# Patient Record
Sex: MALE | Race: WHITE | Employment: FULL TIME | URBAN - METROPOLITAN AREA
[De-identification: names, ages, dates, MRNs, and addresses within clinical notes are randomized per-mention and may not be internally consistent; named-entity substitution may affect disease eponyms.]

---

## 2017-03-09 ENCOUNTER — OFFICE VISIT (OUTPATIENT)
Dept: INTERNAL MEDICINE CLINIC | Age: 52
End: 2017-03-09

## 2017-03-09 VITALS
HEART RATE: 72 BPM | SYSTOLIC BLOOD PRESSURE: 120 MMHG | TEMPERATURE: 98.8 F | BODY MASS INDEX: 26.74 KG/M2 | HEIGHT: 71 IN | WEIGHT: 191 LBS | DIASTOLIC BLOOD PRESSURE: 80 MMHG

## 2017-03-09 DIAGNOSIS — J06.9 VIRAL URI WITH COUGH: Primary | ICD-10-CM

## 2017-03-09 PROCEDURE — 99213 OFFICE O/P EST LOW 20 MIN: CPT | Performed by: FAMILY MEDICINE

## 2017-03-09 RX ORDER — BENZONATATE 100 MG/1
100-200 CAPSULE ORAL 3 TIMES DAILY PRN
Qty: 25 CAPSULE | Refills: 0 | Status: SHIPPED | OUTPATIENT
Start: 2017-03-09 | End: 2017-06-30 | Stop reason: ALTCHOICE

## 2017-03-12 ASSESSMENT — ENCOUNTER SYMPTOMS
VOMITING: 0
DIARRHEA: 0
CHEST TIGHTNESS: 0
RHINORRHEA: 1
SORE THROAT: 0
COUGH: 1
NAUSEA: 0
WHEEZING: 0
SINUS PRESSURE: 1

## 2017-06-27 ENCOUNTER — HOSPITAL ENCOUNTER (OUTPATIENT)
Dept: GENERAL RADIOLOGY | Age: 52
Discharge: HOME | End: 2017-06-27
Attending: INTERNAL MEDICINE | Admitting: INTERNAL MEDICINE

## 2017-06-27 DIAGNOSIS — Z00.00 WELLNESS EXAMINATION: ICD-10-CM

## 2017-06-27 LAB
A/G RATIO: 2 (ref 1.1–2.2)
ALBUMIN SERPL-MCNC: 4.5 G/DL (ref 3.4–5)
ALP BLD-CCNC: 63 U/L (ref 40–129)
ALT SERPL-CCNC: 31 U/L (ref 10–40)
ANION GAP SERPL CALCULATED.3IONS-SCNC: 16 MMOL/L (ref 3–16)
AST SERPL-CCNC: 20 U/L (ref 15–37)
BILIRUB SERPL-MCNC: 0.6 MG/DL (ref 0–1)
BUN BLDV-MCNC: 17 MG/DL (ref 7–20)
CALCIUM SERPL-MCNC: 9.5 MG/DL (ref 8.3–10.6)
CHLORIDE BLD-SCNC: 100 MMOL/L (ref 99–110)
CHOLESTEROL, TOTAL: 241 MG/DL (ref 0–199)
CO2: 23 MMOL/L (ref 21–32)
CREAT SERPL-MCNC: 1.1 MG/DL (ref 0.9–1.3)
GFR AFRICAN AMERICAN: >60
GFR NON-AFRICAN AMERICAN: >60
GLOBULIN: 2.3 G/DL
GLUCOSE BLD-MCNC: 93 MG/DL (ref 70–99)
HCT VFR BLD CALC: 46 % (ref 40.5–52.5)
HDLC SERPL-MCNC: 60 MG/DL (ref 40–60)
HEMOGLOBIN: 16 G/DL (ref 13.5–17.5)
LDL CHOLESTEROL CALCULATED: 152 MG/DL
MCH RBC QN AUTO: 30.3 PG (ref 26–34)
MCHC RBC AUTO-ENTMCNC: 34.7 G/DL (ref 31–36)
MCV RBC AUTO: 87.2 FL (ref 80–100)
PDW BLD-RTO: 13.4 % (ref 12.4–15.4)
PLATELET # BLD: 153 K/UL (ref 135–450)
PMV BLD AUTO: 8.1 FL (ref 5–10.5)
POTASSIUM SERPL-SCNC: 4 MMOL/L (ref 3.5–5.1)
RBC # BLD: 5.27 M/UL (ref 4.2–5.9)
SODIUM BLD-SCNC: 139 MMOL/L (ref 136–145)
TOTAL PROTEIN: 6.8 G/DL (ref 6.4–8.2)
TRIGL SERPL-MCNC: 144 MG/DL (ref 0–150)
VITAMIN D 25-HYDROXY: 36.2 NG/ML
VLDLC SERPL CALC-MCNC: 29 MG/DL
WBC # BLD: 6.1 K/UL (ref 4–11)

## 2017-06-30 ENCOUNTER — OFFICE VISIT (OUTPATIENT)
Dept: INTERNAL MEDICINE CLINIC | Age: 52
End: 2017-06-30

## 2017-06-30 VITALS
OXYGEN SATURATION: 97 % | WEIGHT: 192 LBS | DIASTOLIC BLOOD PRESSURE: 72 MMHG | BODY MASS INDEX: 27.49 KG/M2 | SYSTOLIC BLOOD PRESSURE: 114 MMHG | HEIGHT: 70 IN | HEART RATE: 62 BPM

## 2017-06-30 DIAGNOSIS — K63.5 COLON POLYPS: ICD-10-CM

## 2017-06-30 DIAGNOSIS — G47.00 INSOMNIA, UNSPECIFIED TYPE: ICD-10-CM

## 2017-06-30 DIAGNOSIS — Z00.00 ANNUAL PHYSICAL EXAM: Primary | ICD-10-CM

## 2017-06-30 DIAGNOSIS — E78.5 ELEVATED LIPIDS: ICD-10-CM

## 2017-06-30 PROCEDURE — 99396 PREV VISIT EST AGE 40-64: CPT | Performed by: INTERNAL MEDICINE

## 2018-06-04 ENCOUNTER — TELEPHONE (OUTPATIENT)
Dept: INTERNAL MEDICINE CLINIC | Age: 53
End: 2018-06-04

## 2018-06-04 DIAGNOSIS — Z00.00 ANNUAL PHYSICAL EXAM: Primary | ICD-10-CM

## 2018-06-11 ENCOUNTER — HOSPITAL ENCOUNTER (OUTPATIENT)
Dept: GENERAL RADIOLOGY | Age: 53
Discharge: HOME | End: 2018-06-11
Attending: INTERNAL MEDICINE | Admitting: INTERNAL MEDICINE

## 2018-06-11 DIAGNOSIS — Z00.00 ANNUAL PHYSICAL EXAM: ICD-10-CM

## 2018-06-11 LAB
A/G RATIO: 1.9 (ref 1.1–2.2)
ALBUMIN SERPL-MCNC: 4.3 G/DL (ref 3.4–5)
ALP BLD-CCNC: 59 U/L (ref 40–129)
ALT SERPL-CCNC: 28 U/L (ref 10–40)
ANION GAP SERPL CALCULATED.3IONS-SCNC: 16 MMOL/L (ref 3–16)
AST SERPL-CCNC: 19 U/L (ref 15–37)
BILIRUB SERPL-MCNC: 0.6 MG/DL (ref 0–1)
BUN BLDV-MCNC: 17 MG/DL (ref 7–20)
CALCIUM SERPL-MCNC: 9.3 MG/DL (ref 8.3–10.6)
CHLORIDE BLD-SCNC: 103 MMOL/L (ref 99–110)
CHOLESTEROL, TOTAL: 204 MG/DL (ref 0–199)
CO2: 21 MMOL/L (ref 21–32)
CREAT SERPL-MCNC: 1 MG/DL (ref 0.9–1.3)
GFR AFRICAN AMERICAN: >60
GFR NON-AFRICAN AMERICAN: >60
GLOBULIN: 2.3 G/DL
GLUCOSE BLD-MCNC: 94 MG/DL (ref 70–99)
HCT VFR BLD CALC: 45.8 % (ref 40.5–52.5)
HDLC SERPL-MCNC: 62 MG/DL (ref 40–60)
HEMOGLOBIN: 16.2 G/DL (ref 13.5–17.5)
LDL CHOLESTEROL CALCULATED: 129 MG/DL
MCH RBC QN AUTO: 31 PG (ref 26–34)
MCHC RBC AUTO-ENTMCNC: 35.4 G/DL (ref 31–36)
MCV RBC AUTO: 87.4 FL (ref 80–100)
PDW BLD-RTO: 13.7 % (ref 12.4–15.4)
PLATELET # BLD: 161 K/UL (ref 135–450)
PMV BLD AUTO: 8.4 FL (ref 5–10.5)
POTASSIUM SERPL-SCNC: 4 MMOL/L (ref 3.5–5.1)
PROSTATE SPECIFIC ANTIGEN: 0.56 NG/ML (ref 0–4)
RBC # BLD: 5.24 M/UL (ref 4.2–5.9)
SODIUM BLD-SCNC: 140 MMOL/L (ref 136–145)
TOTAL PROTEIN: 6.6 G/DL (ref 6.4–8.2)
TRIGL SERPL-MCNC: 67 MG/DL (ref 0–150)
TSH REFLEX: 1.13 UIU/ML (ref 0.27–4.2)
VITAMIN D 25-HYDROXY: 25.7 NG/ML
VLDLC SERPL CALC-MCNC: 13 MG/DL
WBC # BLD: 5.5 K/UL (ref 4–11)

## 2018-06-18 ENCOUNTER — OFFICE VISIT (OUTPATIENT)
Dept: INTERNAL MEDICINE CLINIC | Age: 53
End: 2018-06-18

## 2018-06-18 VITALS
BODY MASS INDEX: 28.49 KG/M2 | OXYGEN SATURATION: 98 % | HEIGHT: 70 IN | WEIGHT: 199 LBS | DIASTOLIC BLOOD PRESSURE: 78 MMHG | SYSTOLIC BLOOD PRESSURE: 120 MMHG | HEART RATE: 66 BPM

## 2018-06-18 DIAGNOSIS — Z00.00 ANNUAL PHYSICAL EXAM: Primary | ICD-10-CM

## 2018-06-18 DIAGNOSIS — R31.9 HEMATURIA, UNSPECIFIED TYPE: ICD-10-CM

## 2018-06-18 DIAGNOSIS — G47.00 INSOMNIA, UNSPECIFIED TYPE: ICD-10-CM

## 2018-06-18 DIAGNOSIS — E78.5 ELEVATED LIPIDS: ICD-10-CM

## 2018-06-18 LAB
BILIRUBIN, POC: ABNORMAL
BLOOD URINE, POC: ABNORMAL
CLARITY, POC: CLEAR
COLOR, POC: YELLOW
GLUCOSE URINE, POC: ABNORMAL
KETONES, POC: ABNORMAL
LEUKOCYTE EST, POC: ABNORMAL
NITRITE, POC: ABNORMAL
PH, POC: 5
PROTEIN, POC: ABNORMAL
SPECIFIC GRAVITY, POC: 1.02
UROBILINOGEN, POC: 0.2

## 2018-06-18 PROCEDURE — 81002 URINALYSIS NONAUTO W/O SCOPE: CPT | Performed by: INTERNAL MEDICINE

## 2018-06-18 PROCEDURE — 99396 PREV VISIT EST AGE 40-64: CPT | Performed by: INTERNAL MEDICINE

## 2018-06-18 ASSESSMENT — PATIENT HEALTH QUESTIONNAIRE - PHQ9
SUM OF ALL RESPONSES TO PHQ QUESTIONS 1-9: 0
SUM OF ALL RESPONSES TO PHQ9 QUESTIONS 1 & 2: 0
2. FEELING DOWN, DEPRESSED OR HOPELESS: 0
1. LITTLE INTEREST OR PLEASURE IN DOING THINGS: 0

## 2018-09-18 ENCOUNTER — OFFICE VISIT (OUTPATIENT)
Dept: INTERNAL MEDICINE CLINIC | Age: 53
End: 2018-09-18

## 2018-09-18 ENCOUNTER — HOSPITAL ENCOUNTER (OUTPATIENT)
Age: 53
Discharge: HOME | End: 2018-09-18
Payer: COMMERCIAL

## 2018-09-18 VITALS
HEIGHT: 70 IN | HEART RATE: 61 BPM | BODY MASS INDEX: 29.06 KG/M2 | SYSTOLIC BLOOD PRESSURE: 124 MMHG | WEIGHT: 203 LBS | OXYGEN SATURATION: 98 % | DIASTOLIC BLOOD PRESSURE: 76 MMHG | TEMPERATURE: 98.5 F

## 2018-09-18 DIAGNOSIS — R50.9 FEVER, UNSPECIFIED FEVER CAUSE: ICD-10-CM

## 2018-09-18 DIAGNOSIS — R51.9 NONINTRACTABLE EPISODIC HEADACHE, UNSPECIFIED HEADACHE TYPE: Primary | ICD-10-CM

## 2018-09-18 LAB
BASOPHILS ABSOLUTE: 0 K/UL (ref 0–0.2)
BASOPHILS RELATIVE PERCENT: 0.7 %
C-REACTIVE PROTEIN: <0.3 MG/L (ref 0–5.1)
EOSINOPHILS ABSOLUTE: 0.2 K/UL (ref 0–0.6)
EOSINOPHILS RELATIVE PERCENT: 4 %
HCT VFR BLD CALC: 46.2 % (ref 40.5–52.5)
HEMOGLOBIN: 15.9 G/DL (ref 13.5–17.5)
LYMPHOCYTES ABSOLUTE: 2.2 K/UL (ref 1–5.1)
LYMPHOCYTES RELATIVE PERCENT: 35.7 %
MCH RBC QN AUTO: 30.2 PG (ref 26–34)
MCHC RBC AUTO-ENTMCNC: 34.3 G/DL (ref 31–36)
MCV RBC AUTO: 88 FL (ref 80–100)
MONOCYTES ABSOLUTE: 0.6 K/UL (ref 0–1.3)
MONOCYTES RELATIVE PERCENT: 9.9 %
NEUTROPHILS ABSOLUTE: 3 K/UL (ref 1.7–7.7)
NEUTROPHILS RELATIVE PERCENT: 49.7 %
PDW BLD-RTO: 13.4 % (ref 12.4–15.4)
PLATELET # BLD: 166 K/UL (ref 135–450)
PMV BLD AUTO: 8.1 FL (ref 5–10.5)
RBC # BLD: 5.26 M/UL (ref 4.2–5.9)
SEDIMENTATION RATE, ERYTHROCYTE: 7 MM/HR (ref 0–20)
WBC # BLD: 6.1 K/UL (ref 4–11)

## 2018-09-18 PROCEDURE — 99213 OFFICE O/P EST LOW 20 MIN: CPT | Performed by: NURSE PRACTITIONER

## 2018-09-18 PROCEDURE — 86038 ANTINUCLEAR ANTIBODIES: CPT

## 2018-09-18 PROCEDURE — 85025 COMPLETE CBC W/AUTO DIFF WBC: CPT

## 2018-09-18 PROCEDURE — 36415 COLL VENOUS BLD VENIPUNCTURE: CPT

## 2018-09-18 PROCEDURE — 85652 RBC SED RATE AUTOMATED: CPT

## 2018-09-18 PROCEDURE — 86140 C-REACTIVE PROTEIN: CPT

## 2018-09-18 ASSESSMENT — ENCOUNTER SYMPTOMS
SORE THROAT: 0
SHORTNESS OF BREATH: 0
BLOOD IN STOOL: 0
EYE DISCHARGE: 0
RHINORRHEA: 0
DIARRHEA: 0
COLOR CHANGE: 0
EYES NEGATIVE: 1
CONSTIPATION: 0
COUGH: 0
BACK PAIN: 0
EYE PAIN: 0

## 2018-09-19 LAB
ANA INTERPRETATION: NORMAL
ANTI-NUCLEAR ANTIBODY (ANA): NEGATIVE

## 2019-07-17 ENCOUNTER — TELEPHONE (OUTPATIENT)
Dept: INTERNAL MEDICINE CLINIC | Age: 54
End: 2019-07-17

## 2019-07-17 DIAGNOSIS — Z13.1 SCREENING FOR DIABETES MELLITUS (DM): Primary | ICD-10-CM

## 2019-07-17 DIAGNOSIS — Z13.0 SCREENING FOR DEFICIENCY ANEMIA: ICD-10-CM

## 2019-07-17 DIAGNOSIS — Z12.5 SCREENING FOR PROSTATE CANCER: ICD-10-CM

## 2019-07-17 DIAGNOSIS — Z13.220 SCREENING FOR LIPID DISORDERS: ICD-10-CM

## 2019-08-09 ENCOUNTER — HOSPITAL ENCOUNTER (OUTPATIENT)
Age: 54
Discharge: HOME | End: 2019-08-09
Payer: COMMERCIAL

## 2019-08-09 DIAGNOSIS — Z13.1 SCREENING FOR DIABETES MELLITUS (DM): ICD-10-CM

## 2019-08-09 DIAGNOSIS — Z13.0 SCREENING FOR DEFICIENCY ANEMIA: ICD-10-CM

## 2019-08-09 DIAGNOSIS — Z12.5 SCREENING FOR PROSTATE CANCER: ICD-10-CM

## 2019-08-09 DIAGNOSIS — Z13.220 SCREENING FOR LIPID DISORDERS: ICD-10-CM

## 2019-08-09 LAB
A/G RATIO: 1.8 (ref 1.1–2.2)
ALBUMIN SERPL-MCNC: 4.4 G/DL (ref 3.4–5)
ALP BLD-CCNC: 60 U/L (ref 40–129)
ALT SERPL-CCNC: 76 U/L (ref 10–40)
ANION GAP SERPL CALCULATED.3IONS-SCNC: 12 MMOL/L (ref 3–16)
AST SERPL-CCNC: 35 U/L (ref 15–37)
BASOPHILS ABSOLUTE: 0.1 K/UL (ref 0–0.2)
BASOPHILS RELATIVE PERCENT: 0.9 %
BILIRUB SERPL-MCNC: 0.5 MG/DL (ref 0–1)
BUN BLDV-MCNC: 13 MG/DL (ref 7–20)
CALCIUM SERPL-MCNC: 9.6 MG/DL (ref 8.3–10.6)
CHLORIDE BLD-SCNC: 101 MMOL/L (ref 99–110)
CHOLESTEROL, TOTAL: 205 MG/DL (ref 0–199)
CO2: 25 MMOL/L (ref 21–32)
CREAT SERPL-MCNC: 1.2 MG/DL (ref 0.9–1.3)
EOSINOPHILS ABSOLUTE: 0.2 K/UL (ref 0–0.6)
EOSINOPHILS RELATIVE PERCENT: 4 %
GFR AFRICAN AMERICAN: >60
GFR NON-AFRICAN AMERICAN: >60
GLOBULIN: 2.4 G/DL
GLUCOSE BLD-MCNC: 93 MG/DL (ref 70–99)
HCT VFR BLD CALC: 46.6 % (ref 40.5–52.5)
HDLC SERPL-MCNC: 61 MG/DL (ref 40–60)
HEMOGLOBIN: 16 G/DL (ref 13.5–17.5)
LDL CHOLESTEROL CALCULATED: 123 MG/DL
LYMPHOCYTES ABSOLUTE: 2.1 K/UL (ref 1–5.1)
LYMPHOCYTES RELATIVE PERCENT: 34 %
MCH RBC QN AUTO: 30.4 PG (ref 26–34)
MCHC RBC AUTO-ENTMCNC: 34.4 G/DL (ref 31–36)
MCV RBC AUTO: 88.4 FL (ref 80–100)
MONOCYTES ABSOLUTE: 0.6 K/UL (ref 0–1.3)
MONOCYTES RELATIVE PERCENT: 9.4 %
NEUTROPHILS ABSOLUTE: 3.2 K/UL (ref 1.7–7.7)
NEUTROPHILS RELATIVE PERCENT: 51.7 %
PDW BLD-RTO: 13.9 % (ref 12.4–15.4)
PLATELET # BLD: 149 K/UL (ref 135–450)
PMV BLD AUTO: 8.2 FL (ref 5–10.5)
POTASSIUM SERPL-SCNC: 4.3 MMOL/L (ref 3.5–5.1)
PROSTATE SPECIFIC ANTIGEN: 0.76 NG/ML (ref 0–4)
RBC # BLD: 5.27 M/UL (ref 4.2–5.9)
SODIUM BLD-SCNC: 138 MMOL/L (ref 136–145)
TOTAL PROTEIN: 6.8 G/DL (ref 6.4–8.2)
TRIGL SERPL-MCNC: 103 MG/DL (ref 0–150)
VLDLC SERPL CALC-MCNC: 21 MG/DL
WBC # BLD: 6.2 K/UL (ref 4–11)

## 2019-08-09 PROCEDURE — 85025 COMPLETE CBC W/AUTO DIFF WBC: CPT

## 2019-08-09 PROCEDURE — 36415 COLL VENOUS BLD VENIPUNCTURE: CPT

## 2019-08-09 PROCEDURE — 80061 LIPID PANEL: CPT

## 2019-08-09 PROCEDURE — 80053 COMPREHEN METABOLIC PANEL: CPT

## 2019-08-09 PROCEDURE — 84153 ASSAY OF PSA TOTAL: CPT

## 2019-08-19 ENCOUNTER — OFFICE VISIT (OUTPATIENT)
Dept: INTERNAL MEDICINE CLINIC | Age: 54
End: 2019-08-19
Payer: COMMERCIAL

## 2019-08-19 VITALS
HEART RATE: 78 BPM | BODY MASS INDEX: 28.7 KG/M2 | DIASTOLIC BLOOD PRESSURE: 80 MMHG | OXYGEN SATURATION: 98 % | HEIGHT: 71 IN | WEIGHT: 205 LBS | SYSTOLIC BLOOD PRESSURE: 118 MMHG

## 2019-08-19 DIAGNOSIS — E66.3 OVERWEIGHT (BMI 25.0-29.9): ICD-10-CM

## 2019-08-19 DIAGNOSIS — E78.41 ELEVATED LIPOPROTEIN(A): ICD-10-CM

## 2019-08-19 DIAGNOSIS — Z13.1 SCREENING FOR DIABETES MELLITUS: ICD-10-CM

## 2019-08-19 DIAGNOSIS — Z12.5 SCREENING PSA (PROSTATE SPECIFIC ANTIGEN): ICD-10-CM

## 2019-08-19 DIAGNOSIS — Z00.00 ENCOUNTER FOR WELL ADULT EXAM WITHOUT ABNORMAL FINDINGS: Primary | ICD-10-CM

## 2019-08-19 DIAGNOSIS — Z13.0 SCREENING FOR DEFICIENCY ANEMIA: ICD-10-CM

## 2019-08-19 PROCEDURE — 99396 PREV VISIT EST AGE 40-64: CPT | Performed by: NURSE PRACTITIONER

## 2019-08-19 ASSESSMENT — ENCOUNTER SYMPTOMS
SORE THROAT: 0
COUGH: 0
CHEST TIGHTNESS: 0
NAUSEA: 0
SHORTNESS OF BREATH: 0
VOMITING: 0
DIARRHEA: 0
CONSTIPATION: 0
SINUS PAIN: 0

## 2019-08-19 ASSESSMENT — PATIENT HEALTH QUESTIONNAIRE - PHQ9
2. FEELING DOWN, DEPRESSED OR HOPELESS: 0
1. LITTLE INTEREST OR PLEASURE IN DOING THINGS: 0
SUM OF ALL RESPONSES TO PHQ QUESTIONS 1-9: 0
SUM OF ALL RESPONSES TO PHQ9 QUESTIONS 1 & 2: 0
SUM OF ALL RESPONSES TO PHQ QUESTIONS 1-9: 0

## 2020-08-03 ENCOUNTER — HOSPITAL ENCOUNTER (OUTPATIENT)
Age: 55
Discharge: HOME | End: 2020-08-03
Payer: COMMERCIAL

## 2020-08-03 LAB
A/G RATIO: 1.7 (ref 1.1–2.2)
ALBUMIN SERPL-MCNC: 4.2 G/DL (ref 3.4–5)
ALP BLD-CCNC: 57 U/L (ref 40–129)
ALT SERPL-CCNC: 48 U/L (ref 10–40)
ANION GAP SERPL CALCULATED.3IONS-SCNC: 13 MMOL/L (ref 3–16)
AST SERPL-CCNC: 29 U/L (ref 15–37)
BASOPHILS ABSOLUTE: 0 K/UL (ref 0–0.2)
BASOPHILS RELATIVE PERCENT: 0.9 %
BILIRUB SERPL-MCNC: 0.6 MG/DL (ref 0–1)
BUN BLDV-MCNC: 12 MG/DL (ref 7–20)
CALCIUM SERPL-MCNC: 9.3 MG/DL (ref 8.3–10.6)
CHLORIDE BLD-SCNC: 104 MMOL/L (ref 99–110)
CHOLESTEROL, TOTAL: 214 MG/DL (ref 0–199)
CO2: 23 MMOL/L (ref 21–32)
CREAT SERPL-MCNC: 1.1 MG/DL (ref 0.9–1.3)
EOSINOPHILS ABSOLUTE: 0.3 K/UL (ref 0–0.6)
EOSINOPHILS RELATIVE PERCENT: 5 %
GFR AFRICAN AMERICAN: >60
GFR NON-AFRICAN AMERICAN: >60
GLOBULIN: 2.5 G/DL
GLUCOSE BLD-MCNC: 100 MG/DL (ref 70–99)
HCT VFR BLD CALC: 47 % (ref 40.5–52.5)
HDLC SERPL-MCNC: 62 MG/DL (ref 40–60)
HEMOGLOBIN: 16.1 G/DL (ref 13.5–17.5)
LDL CHOLESTEROL CALCULATED: 135 MG/DL
LYMPHOCYTES ABSOLUTE: 2.1 K/UL (ref 1–5.1)
LYMPHOCYTES RELATIVE PERCENT: 38.4 %
MCH RBC QN AUTO: 30.3 PG (ref 26–34)
MCHC RBC AUTO-ENTMCNC: 34.2 G/DL (ref 31–36)
MCV RBC AUTO: 88.7 FL (ref 80–100)
MONOCYTES ABSOLUTE: 0.6 K/UL (ref 0–1.3)
MONOCYTES RELATIVE PERCENT: 10.8 %
NEUTROPHILS ABSOLUTE: 2.4 K/UL (ref 1.7–7.7)
NEUTROPHILS RELATIVE PERCENT: 44.9 %
PDW BLD-RTO: 14 % (ref 12.4–15.4)
PLATELET # BLD: 140 K/UL (ref 135–450)
PMV BLD AUTO: 8.3 FL (ref 5–10.5)
POTASSIUM SERPL-SCNC: 4.3 MMOL/L (ref 3.5–5.1)
PROSTATE SPECIFIC ANTIGEN: 0.88 NG/ML (ref 0–4)
RBC # BLD: 5.31 M/UL (ref 4.2–5.9)
SODIUM BLD-SCNC: 140 MMOL/L (ref 136–145)
TOTAL PROTEIN: 6.7 G/DL (ref 6.4–8.2)
TRIGL SERPL-MCNC: 86 MG/DL (ref 0–150)
VLDLC SERPL CALC-MCNC: 17 MG/DL
WBC # BLD: 5.4 K/UL (ref 4–11)

## 2020-08-03 PROCEDURE — 80053 COMPREHEN METABOLIC PANEL: CPT

## 2020-08-03 PROCEDURE — 84153 ASSAY OF PSA TOTAL: CPT

## 2020-08-03 PROCEDURE — 80061 LIPID PANEL: CPT

## 2020-08-03 PROCEDURE — 85025 COMPLETE CBC W/AUTO DIFF WBC: CPT

## 2020-08-03 PROCEDURE — 36415 COLL VENOUS BLD VENIPUNCTURE: CPT

## 2020-09-23 ENCOUNTER — OFFICE VISIT (OUTPATIENT)
Dept: INTERNAL MEDICINE CLINIC | Age: 55
End: 2020-09-23
Payer: COMMERCIAL

## 2020-09-23 VITALS
SYSTOLIC BLOOD PRESSURE: 120 MMHG | HEART RATE: 76 BPM | OXYGEN SATURATION: 99 % | HEIGHT: 71 IN | WEIGHT: 206 LBS | DIASTOLIC BLOOD PRESSURE: 82 MMHG | BODY MASS INDEX: 28.84 KG/M2 | TEMPERATURE: 98 F

## 2020-09-23 PROCEDURE — 99396 PREV VISIT EST AGE 40-64: CPT | Performed by: NURSE PRACTITIONER

## 2020-09-23 ASSESSMENT — ENCOUNTER SYMPTOMS
CHEST TIGHTNESS: 0
NAUSEA: 0
SHORTNESS OF BREATH: 0
VOMITING: 0
COUGH: 0
CONSTIPATION: 0
SINUS PAIN: 0
DIARRHEA: 0
SORE THROAT: 0

## 2020-09-23 ASSESSMENT — PATIENT HEALTH QUESTIONNAIRE - PHQ9
2. FEELING DOWN, DEPRESSED OR HOPELESS: 0
SUM OF ALL RESPONSES TO PHQ QUESTIONS 1-9: 0
SUM OF ALL RESPONSES TO PHQ9 QUESTIONS 1 & 2: 0
1. LITTLE INTEREST OR PLEASURE IN DOING THINGS: 0
SUM OF ALL RESPONSES TO PHQ QUESTIONS 1-9: 0

## 2020-09-23 NOTE — PROGRESS NOTES
you at risk of harm, such as climbing, using stairs,operating machinery, or driving vehicles. If you feel you cannot safely do these activities, you should request others to help you, or avoid the activities altogether. If you are drowsy for any other reason, you should usethe same precautions as listed above. Call if pattern of symptoms change or persists for an extended time.     Grace Olivia, FNP-C

## 2021-03-16 ENCOUNTER — OFFICE VISIT (OUTPATIENT)
Dept: INTERNAL MEDICINE CLINIC | Age: 56
End: 2021-03-16
Payer: COMMERCIAL

## 2021-03-16 VITALS
OXYGEN SATURATION: 98 % | SYSTOLIC BLOOD PRESSURE: 110 MMHG | WEIGHT: 210 LBS | DIASTOLIC BLOOD PRESSURE: 70 MMHG | BODY MASS INDEX: 29.4 KG/M2 | HEART RATE: 67 BPM | TEMPERATURE: 97.3 F | HEIGHT: 71 IN

## 2021-03-16 DIAGNOSIS — H00.11 CHALAZION OF RIGHT UPPER EYELID: Primary | ICD-10-CM

## 2021-03-16 PROCEDURE — 99213 OFFICE O/P EST LOW 20 MIN: CPT | Performed by: NURSE PRACTITIONER

## 2021-03-16 RX ORDER — CEPHALEXIN 500 MG/1
500 CAPSULE ORAL 3 TIMES DAILY
Qty: 21 CAPSULE | Refills: 0 | Status: SHIPPED | OUTPATIENT
Start: 2021-03-16 | End: 2021-03-23

## 2021-03-16 SDOH — ECONOMIC STABILITY: TRANSPORTATION INSECURITY
IN THE PAST 12 MONTHS, HAS THE LACK OF TRANSPORTATION KEPT YOU FROM MEDICAL APPOINTMENTS OR FROM GETTING MEDICATIONS?: NOT ASKED

## 2021-03-16 SDOH — ECONOMIC STABILITY: FOOD INSECURITY: WITHIN THE PAST 12 MONTHS, YOU WORRIED THAT YOUR FOOD WOULD RUN OUT BEFORE YOU GOT MONEY TO BUY MORE.: NEVER TRUE

## 2021-03-16 ASSESSMENT — ENCOUNTER SYMPTOMS
SHORTNESS OF BREATH: 0
DIARRHEA: 0
CONSTIPATION: 0
PHOTOPHOBIA: 0
VOMITING: 0
NAUSEA: 0
COUGH: 0
SORE THROAT: 0
SINUS PAIN: 0
EYE PAIN: 1
CHEST TIGHTNESS: 0

## 2021-03-16 ASSESSMENT — PATIENT HEALTH QUESTIONNAIRE - PHQ9
1. LITTLE INTEREST OR PLEASURE IN DOING THINGS: 0
SUM OF ALL RESPONSES TO PHQ QUESTIONS 1-9: 0
2. FEELING DOWN, DEPRESSED OR HOPELESS: 0

## 2021-03-16 NOTE — PROGRESS NOTES
[address] [facility]  [address]  Tel:[phone]    Brook Centeno is a 60 y.o. male who presents today for his medical conditions/complaints as noted below. Brook Centeno is c/o of Eye Problem (Red and swollen upper right eye lid x1 week.  )      Chief Complaint   Patient presents with    Eye Problem     Red and swollen upper right eye lid x1 week. HPI:     Mr. Martha Montano presents with mildly painful swelling of the right upper eyelid for roughly 1 week. He states he does not know a particular cause, however was rubbing his eye and noticed tenderness. He took out his contact which seemed to help symptoms. He seems to have noticed worsening swelling/pressure lately. States he has switched to wearing glasses for the time being. Denies decreased vision other than wearing old glasses. Denies drainage, fever, headache, cheek/dental pain. Has used warm compresses to treat with minimal relief. Past Medical History:   Diagnosis Date    Colon polyps 2016    sessile serrated adenomas: Dr. Squires Sasusan Hyperlipidemia     Sinusitis     Tinnitus of both ears         Past Surgical History:   Procedure Laterality Date    COLONOSCOPY  2016    sessile serrated adenomas x 4: Diverticulosis. Dr. [address]       Family History   Problem Relation Age of Onset    Cancer Father         Throat CA    Other Father         Tob / ETOH use    Cancer Mother         Breast CA with Mets    Arthritis Mother        Social History     Tobacco Use    Smoking status: Former Smoker     Packs/day: 1.00     Years: 12.00     Pack years: 12.00     Types: Cigarettes     Quit date: 1995     Years since quittin.1    Smokeless tobacco: Never Used   Substance Use Topics    Alcohol use:  Yes     Alcohol/week: 5.8 standard drinks     Types: 7 Standard drinks or equivalent per week     Comment: 1 a day        Current Outpatient Medications Medication Sig Dispense Refill    Cholecalciferol (VITAMIN D3 PO) Take by mouth      cephALEXin (KEFLEX) 500 MG capsule Take 1 capsule by mouth 3 times daily for 7 days 21 capsule 0     No current facility-administered medications for this visit. No Known Allergies    Subjective:      Review of Systems   Constitutional: Negative for fever. HENT: Negative for sinus pain and sore throat. Eyes: Positive for pain (Swelling upper right eyelid). Negative for photophobia and visual disturbance. Respiratory: Negative for cough, chest tightness and shortness of breath. Cardiovascular: Negative for chest pain and palpitations. Gastrointestinal: Negative for constipation, diarrhea, nausea and vomiting. Genitourinary: Negative for dysuria and urgency. Skin: Negative for rash. Neurological: Negative for dizziness and weakness. Objective:     Vitals:    03/16/21 1056   BP: 110/70   Site: Left Upper Arm   Position: Sitting   Cuff Size: Medium Adult   Pulse: 67   Temp: 97.3 °F (36.3 °C)   TempSrc: Infrared   SpO2: 98%   Weight: 210 lb (95.3 kg)   Height: 5' 10.5\" (1.791 m)       Physical Exam  Vitals signs and nursing note reviewed. Constitutional:       Appearance: Normal appearance. He is well-developed. HENT:      Head: Normocephalic and atraumatic. Right Ear: Hearing and external ear normal.      Left Ear: Hearing and external ear normal.      Nose: Nose normal.   Eyes:      General: Lids are normal.         Right eye: No foreign body, discharge or hordeolum. Left eye: No foreign body, discharge or hordeolum. Extraocular Movements: Extraocular movements intact. Conjunctiva/sclera: Conjunctivae normal.      Pupils: Pupils are equal, round, and reactive to light. Comments: Questionable chalazion with spreading cellulitis of the remainder of the eyelid. Upper eyelid is puffy and almost appears fluid filled. Neck:      Musculoskeletal: Normal range of motion. theseactivities, you should request others to help you, or avoid the activities altogether. If you are drowsy for any other reason, you should use the same precautions as listed above. Call if pattern of symptoms change or persists for an extended time.       Lula Bell

## 2021-03-24 ENCOUNTER — IMMUNIZATION (OUTPATIENT)
Dept: PRIMARY CARE CLINIC | Age: 56
End: 2021-03-24
Payer: COMMERCIAL

## 2021-03-24 PROCEDURE — 0011A COVID-19, MODERNA VACCINE 100MCG/0.5ML DOSE: CPT | Performed by: FAMILY MEDICINE

## 2021-03-24 PROCEDURE — 91301 COVID-19, MODERNA VACCINE 100MCG/0.5ML DOSE: CPT | Performed by: FAMILY MEDICINE

## 2021-03-25 ENCOUNTER — TELEPHONE (OUTPATIENT)
Dept: INTERNAL MEDICINE CLINIC | Age: 56
End: 2021-03-25

## 2021-03-25 NOTE — TELEPHONE ENCOUNTER
Would recommend not wearing contacts for the time being as this can and likely will irritate the eye. Would recommend he continue drops (liquid tears and warm compresses).  If symptoms persist, might benefit from see ophthalmology

## 2021-04-21 ENCOUNTER — IMMUNIZATION (OUTPATIENT)
Dept: PRIMARY CARE CLINIC | Age: 56
End: 2021-04-21
Payer: COMMERCIAL

## 2021-04-21 PROCEDURE — 91301 COVID-19, MODERNA VACCINE 100MCG/0.5ML DOSE: CPT

## 2021-04-21 PROCEDURE — 0012A COVID-19, MODERNA VACCINE 100MCG/0.5ML DOSE: CPT

## 2021-07-15 ENCOUNTER — TELEPHONE (OUTPATIENT)
Dept: INTERNAL MEDICINE CLINIC | Age: 56
End: 2021-07-15

## 2021-07-15 NOTE — TELEPHONE ENCOUNTER
----- Message from Donya Maharaj sent at 7/15/2021  8:57 AM EDT -----  Subject: Message to Provider    QUESTIONS  Information for Provider? Pt is scheduled 9/20 for a wellness Phy and will   need lab orders for the appointment. Please write labs then notify per pt   request.   ---------------------------------------------------------------------------  --------------  CALL BACK INFO  What is the best way for the office to contact you? OK to leave message on   voicemail, OK to respond with electronic message via [employer] portal (only   for patients who have registered [employer] account)  Preferred Call Back Phone Number? [phone]  ---------------------------------------------------------------------------  --------------  SCRIPT ANSWERS  Relationship to Patient?  Self

## 2021-09-13 ENCOUNTER — HOSPITAL ENCOUNTER (OUTPATIENT)
Age: 56
Discharge: HOME | End: 2021-09-13
Payer: COMMERCIAL

## 2021-09-13 ENCOUNTER — TELEPHONE (OUTPATIENT)
Dept: INTERNAL MEDICINE CLINIC | Age: 56
End: 2021-09-13

## 2021-09-13 DIAGNOSIS — Z00.00 ANNUAL PHYSICAL EXAM: Primary | ICD-10-CM

## 2021-09-13 DIAGNOSIS — Z00.00 ANNUAL PHYSICAL EXAM: ICD-10-CM

## 2021-09-13 LAB
A/G RATIO: 1.9 (ref 1.1–2.2)
ALBUMIN SERPL-MCNC: 4.5 G/DL (ref 3.4–5)
ALP BLD-CCNC: 64 U/L (ref 40–129)
ALT SERPL-CCNC: 39 U/L (ref 10–40)
ANION GAP SERPL CALCULATED.3IONS-SCNC: 13 MMOL/L (ref 3–16)
AST SERPL-CCNC: 27 U/L (ref 15–37)
BILIRUB SERPL-MCNC: 0.6 MG/DL (ref 0–1)
BUN BLDV-MCNC: 13 MG/DL (ref 7–20)
CALCIUM SERPL-MCNC: 9.8 MG/DL (ref 8.3–10.6)
CHLORIDE BLD-SCNC: 104 MMOL/L (ref 99–110)
CHOLESTEROL, TOTAL: 239 MG/DL (ref 0–199)
CO2: 24 MMOL/L (ref 21–32)
CREAT SERPL-MCNC: 1.2 MG/DL (ref 0.9–1.3)
GFR AFRICAN AMERICAN: >60
GFR NON-AFRICAN AMERICAN: >60
GLOBULIN: 2.4 G/DL
GLUCOSE BLD-MCNC: 85 MG/DL (ref 70–99)
HCT VFR BLD CALC: 46.9 % (ref 40.5–52.5)
HDLC SERPL-MCNC: 64 MG/DL (ref 40–60)
HEMOGLOBIN: 15.9 G/DL (ref 13.5–17.5)
LDL CHOLESTEROL CALCULATED: 153 MG/DL
MCH RBC QN AUTO: 30.7 PG (ref 26–34)
MCHC RBC AUTO-ENTMCNC: 34 G/DL (ref 31–36)
MCV RBC AUTO: 90.3 FL (ref 80–100)
PDW BLD-RTO: 13.8 % (ref 12.4–15.4)
PLATELET # BLD: 154 K/UL (ref 135–450)
PMV BLD AUTO: 8.6 FL (ref 5–10.5)
POTASSIUM SERPL-SCNC: 4.9 MMOL/L (ref 3.5–5.1)
PROSTATE SPECIFIC ANTIGEN: 0.7 NG/ML (ref 0–4)
RBC # BLD: 5.2 M/UL (ref 4.2–5.9)
SODIUM BLD-SCNC: 141 MMOL/L (ref 136–145)
TOTAL PROTEIN: 6.9 G/DL (ref 6.4–8.2)
TRIGL SERPL-MCNC: 109 MG/DL (ref 0–150)
TSH REFLEX: 1.15 UIU/ML (ref 0.27–4.2)
VLDLC SERPL CALC-MCNC: 22 MG/DL
WBC # BLD: 6.3 K/UL (ref 4–11)

## 2021-09-13 PROCEDURE — 80053 COMPREHEN METABOLIC PANEL: CPT

## 2021-09-13 PROCEDURE — 84153 ASSAY OF PSA TOTAL: CPT

## 2021-09-13 PROCEDURE — 80061 LIPID PANEL: CPT

## 2021-09-13 PROCEDURE — 36415 COLL VENOUS BLD VENIPUNCTURE: CPT

## 2021-09-13 PROCEDURE — 84443 ASSAY THYROID STIM HORMONE: CPT

## 2021-09-13 PROCEDURE — 85027 COMPLETE CBC AUTOMATED: CPT

## 2021-09-13 NOTE — TELEPHONE ENCOUNTER
Patient is downstairs waiting to get lab work drawn for an upcoming appt. I told Redd Rose in the lab to draw for   PSA, Lipid, CBC and CMP ( which was what has been ordered I the past). Please  Order  these labs today.

## 2021-09-19 NOTE — PROGRESS NOTES
History and Physical      Ty Land  YOB: 1965    Date of Service:  9/20/2021    Chief Complaint:   Ty Land is a 60 y.o. male who presents for complete physical examination. HPI:  Review last office visit with me: 6/18/2018. History and physical.  Patient was going through a dental implant at that time. Difficulty with insomnia but declined treatment. Elevated lipids but declined treatment. Hematuria was noted and referral to urology but apparently did not complete that the patient. Vitamin D was low. Treated over-the-counter. Reviewed laboratory data 9/13/2021: Chemistry panel: Unremarkable. Lipid panel: Total cholesterol: 239. LDL cholesterol: 153. TSH 1.15. CBC unremarkable. PSA 0.70. Health maintenance: Hepatitis C, HIV, shingles. Patient complains of dyspnea with steps. Denies chest pain.     Wt Readings from Last 3 Encounters:   03/16/21 210 lb (95.3 kg)   09/23/20 206 lb (93.4 kg)   08/19/19 205 lb (93 kg)     BP Readings from Last 3 Encounters:   03/16/21 110/70   09/23/20 120/82   08/19/19 118/80       Patient Active Problem List   Diagnosis    Insomnia    Low back pain    Hyperlipidemia    Tinnitus of both ears    Hematuria    Low vitamin D level    Colon polyps       Preventive Care:  Health Maintenance   Topic Date Due    Hepatitis C screen  Never done    HIV screen  Never done    Shingles Vaccine (1 of 2) Never done    Flu vaccine (1) 09/01/2021    DTaP/Tdap/Td vaccine (2 - Td or Tdap) 04/12/2023    Lipid screen  09/13/2026    Colon cancer screen colonoscopy  11/04/2026    COVID-19 Vaccine  Completed    Hepatitis A vaccine  Aged Out    Hepatitis B vaccine  Aged Out    Hib vaccine  Aged Out    Meningococcal (ACWY) vaccine  Aged Out    Pneumococcal 0-64 years Vaccine  Aged Out      Self-testicular exams:  n  Sexual activity:  m  Last eye exam: 2020    Exercise:  n  Seatbelt use:  y  Lipid panel:    Lab Results   Component Value Date    CHOL 239 (H) 2021    TRIG 109 2021    HDL 64 (H) 2021    LDLCALC 153 (H) 2021       Living will:  y    Immunization History   Administered Date(s) Administered    COVID-19, Moderna, PF, 100mcg/0.5mL 2021, 2021    Influenza Vaccine, unspecified formulation 10/01/2016    Influenza Virus Vaccine 2020    Tdap (Boostrix, Adacel) 2013       No Known Allergies  No outpatient medications have been marked as taking for the 21 encounter (Appointment) with Danis Laguerre MD.       Past Medical History:   Diagnosis Date    Colon polyps 2016    sessile serrated adenomas: Dr. Jess Powell Hyperlipidemia     Sinusitis     Tinnitus of both ears      Past Surgical History:   Procedure Laterality Date    COLONOSCOPY  2016    sessile serrated adenomas x 4: Diverticulosis. Dr. Latrice Chavez     Family History   Problem Relation Age of Onset    Cancer Father         Throat CA    Other Father         Tob / ETOH use    Cancer Mother         Breast CA with Mets    Arthritis Mother      Social History     Socioeconomic History    Marital status:      Spouse name: Not on file    Number of children: Not on file    Years of education: Not on file    Highest education level: Not on file   Occupational History    Not on file   Tobacco Use    Smoking status: Former Smoker     Packs/day: 1.00     Years: 12.00     Pack years: 12.00     Types: Cigarettes     Quit date: 1995     Years since quittin.7    Smokeless tobacco: Never Used   Substance and Sexual Activity    Alcohol use:  Yes     Alcohol/week: 5.8 standard drinks     Types: 7 Standard drinks or equivalent per week     Comment: 1 a day    Drug use: No    Sexual activity: Never   Other Topics Concern    Not on file   Social History Narrative    Not on file     Social Determinants of Health     Financial Resource Strain: Low Risk     Difficulty of Paying Living Expenses: Not hard at all   Food Insecurity: No Food Insecurity    Worried About Running Out of Food in the Last Year: Never true    Patricia of Food in the Last Year: Never true   Transportation Needs:     Lack of Transportation (Medical):  Lack of Transportation (Non-Medical):    Physical Activity:     Days of Exercise per Week:     Minutes of Exercise per Session:    Stress:     Feeling of Stress :    Social Connections:     Frequency of Communication with Friends and Family:     Frequency of Social Gatherings with Friends and Family:     Attends Nondenominational Services:     Active Member of Clubs or Organizations:     Attends Club or Organization Meetings:     Marital Status:    Intimate Partner Violence:     Fear of Current or Ex-Partner:     Emotionally Abused:     Physically Abused:     Sexually Abused:        Review of Systems:  Constitutional: negative  HENT: negative  EYES:  L floaters  Respiratory: dyspnea with steps  Cardiovasular :Negative  Gastrointestinal: negative  Endocrine: negative  Musculoskeletal: negative  Skin: negative  Allergic/Immunological: negative  Hematological: negative  Psychiatric/Behavorial:  Insomnia x4 to 5 years. Awakens 45 times a night returns to sleep. Coffee: 2 cups caffeinated in the morning. No soda. Alcohol 1 or 2 beers in the evening. Tobacco quit 26 years ago  :  Hematuria failed to see uro  Renal: negative  CNS: negative    Physical Exam:   Blood pressure 124/82, pulse 78, height 5' 11.25\" (1.81 m), weight 208 lb (94.3 kg), SpO2 98 %. Constitutional: He is oriented to person, place, and time. He appears well-developed and well-nourished. No distress. HEENT:   Head: Normocephalic and atraumatic. Right Ear: Tympanic membrane, external ear and ear canal normal.   Left Ear: Tympanic membrane, external ear and ear canal normal.   Nose: Nose normal.   Mouth/Throat: Oropharynx is clear and moist and mucous membranes are normal. No oropharyngeal exudate or posterior oropharyngeal erythema.  He has no cervical adenopathy. Eyes: Conjunctivae and extraocular motions are normal. Pupils are equal, round, and reactive to light. Neck: Full passive range of motion without pain. Neck supple. No JVD present. Carotid bruit is not present. No mass and no thyromegaly present. Cardiovascular: Normal rate, regular rhythm, normal heart sounds and intact distal pulses. Exam reveals no gallop and no friction rub. No murmur heard. Pulmonary/Chest: Effort normal and breath sounds normal. No respiratory distress. He has no wheezes, rhonchi or rales. Abdominal: Soft, non-tender. Bowel sounds and aorta are normal. There is no organomegaly, mass or bruit. Genitourinary: Not examined. Musculoskeletal: Normal range of motion, no synovitis. He exhibits no edema. Neurological: He is alert and oriented to person, place, and time. He has normal reflexes. No cranial nerve deficit. Coordination normal.   Skin: Skin is warm, dry and intact. No suspicious lesions are noted. Psychiatric: He has a normal mood and affect. His speech is normal and behavior is normal. Judgment, cognition and memory are normal.   Testing: Urinalysis: Small amount of blood    Results for orders placed or performed during the hospital encounter of 09/13/21   CBC   Result Value Ref Range    WBC 6.3 4.0 - 11.0 K/uL    RBC 5.20 4. 20 - 5.90 M/uL    Hemoglobin 15.9 13.5 - 17.5 g/dL    Hematocrit 46.9 40.5 - 52.5 %    MCV 90.3 80.0 - 100.0 fL    MCH 30.7 26.0 - 34.0 pg    MCHC 34.0 31.0 - 36.0 g/dL    RDW 13.8 12.4 - 15.4 %    Platelets 036 394 - 755 K/uL    MPV 8.6 5.0 - 10.5 fL   Lipid Panel   Result Value Ref Range    Cholesterol, Total 239 (H) 0 - 199 mg/dL    Triglycerides 109 0 - 150 mg/dL    HDL 64 (H) 40 - 60 mg/dL    LDL Calculated 153 (H) <100 mg/dL    VLDL Cholesterol Calculated 22 Not Established mg/dL   Comprehensive Metabolic Panel   Result Value Ref Range    Sodium 141 136 - 145 mmol/L    Potassium 4.9 3.5 - 5.1 mmol/L    Chloride 104 99 - 110 mmol/L    CO2 24 21 - 32 mmol/L    Anion Gap 13 3 - 16    Glucose 85 70 - 99 mg/dL    BUN 13 7 - 20 mg/dL    CREATININE 1.2 0.9 - 1.3 mg/dL    GFR Non-African American >60 >60    GFR African American >60 >60    Calcium 9.8 8.3 - 10.6 mg/dL    Total Protein 6.9 6.4 - 8.2 g/dL    Albumin 4.5 3.4 - 5.0 g/dL    Albumin/Globulin Ratio 1.9 1.1 - 2.2    Total Bilirubin 0.6 0.0 - 1.0 mg/dL    Alkaline Phosphatase 64 40 - 129 U/L    ALT 39 10 - 40 U/L    AST 27 15 - 37 U/L    Globulin 2.4 g/dL   TSH with Reflex   Result Value Ref Range    TSH 1.15 0.27 - 4.20 uIU/mL   PSA screening   Result Value Ref Range    PSA 0.70 0.00 - 4.00 ng/mL     Assessment/Plan:  Patient Active Problem List   Diagnosis    Insomnia    Low back pain    Hyperlipidemia    Tinnitus of both ears    Hematuria    Low vitamin D level    Colon polyps     Diagnoses and all orders for this visit:    Annual physical exam:                Urinalysis obtained today still demonstrating positive for blood. Again referral to urology    Hyperlipidemia, unspecified hyperlipidemia type             Borderline elevated lipids. Suggested Mediterranean diet and recheck fasting    Insomnia, unspecified type                  Of note coffee consumption as well as alcohol. Suggested tapering both and trial of melatonin    Low vitamin D level             We will check level. Hematuria, unspecified type                  Referral to urology for evaluation    Polyp of colon, unspecified part of colon, unspecified type: Colonoscopy in 2016.   He states he will call [facility] to set up repeat colonoscopy    Dr. Gracy Dunn

## 2021-09-20 ENCOUNTER — OFFICE VISIT (OUTPATIENT)
Dept: INTERNAL MEDICINE CLINIC | Age: 56
End: 2021-09-20
Payer: COMMERCIAL

## 2021-09-20 VITALS
DIASTOLIC BLOOD PRESSURE: 82 MMHG | HEART RATE: 78 BPM | OXYGEN SATURATION: 98 % | BODY MASS INDEX: 29.12 KG/M2 | WEIGHT: 208 LBS | HEIGHT: 71 IN | SYSTOLIC BLOOD PRESSURE: 124 MMHG

## 2021-09-20 DIAGNOSIS — R79.89 LOW VITAMIN D LEVEL: ICD-10-CM

## 2021-09-20 DIAGNOSIS — Z00.00 ANNUAL PHYSICAL EXAM: Primary | ICD-10-CM

## 2021-09-20 DIAGNOSIS — E78.5 HYPERLIPIDEMIA, UNSPECIFIED HYPERLIPIDEMIA TYPE: ICD-10-CM

## 2021-09-20 DIAGNOSIS — R31.9 HEMATURIA, UNSPECIFIED TYPE: ICD-10-CM

## 2021-09-20 DIAGNOSIS — K63.5 POLYP OF COLON, UNSPECIFIED PART OF COLON, UNSPECIFIED TYPE: ICD-10-CM

## 2021-09-20 DIAGNOSIS — R06.09 DOE (DYSPNEA ON EXERTION): ICD-10-CM

## 2021-09-20 DIAGNOSIS — G47.00 INSOMNIA, UNSPECIFIED TYPE: ICD-10-CM

## 2021-09-20 LAB
BILIRUBIN, POC: NEGATIVE
BLOOD URINE, POC: ABNORMAL
CLARITY, POC: CLEAR
COLOR, POC: YELLOW
GLUCOSE URINE, POC: NEGATIVE
KETONES, POC: NEGATIVE
LEUKOCYTE EST, POC: NEGATIVE
NITRITE, POC: NEGATIVE
PH, POC: 5
PROTEIN, POC: NEGATIVE
SPECIFIC GRAVITY, POC: 1.02
UROBILINOGEN, POC: NORMAL

## 2021-09-20 PROCEDURE — 99396 PREV VISIT EST AGE 40-64: CPT | Performed by: INTERNAL MEDICINE

## 2021-09-20 PROCEDURE — 81002 URINALYSIS NONAUTO W/O SCOPE: CPT | Performed by: INTERNAL MEDICINE

## 2021-12-09 ENCOUNTER — HOSPITAL ENCOUNTER (OUTPATIENT)
Dept: NON INVASIVE DIAGNOSTICS | Age: 56
Discharge: HOME | End: 2021-12-09
Payer: COMMERCIAL

## 2021-12-09 DIAGNOSIS — R06.09 DOE (DYSPNEA ON EXERTION): ICD-10-CM

## 2021-12-09 PROCEDURE — 93017 CV STRESS TEST TRACING ONLY: CPT

## 2021-12-16 ENCOUNTER — TELEPHONE (OUTPATIENT)
Dept: INTERNAL MEDICINE CLINIC | Age: 56
End: 2021-12-16

## 2021-12-16 NOTE — TELEPHONE ENCOUNTER
Please call patient. Stress test from 12/9/2021 was normal.  How is patient doing?   Dr. Lizzette Martinez

## 2022-02-11 LAB — PATHOLOGY/CYTOLOGY REPORT: NORMAL

## 2022-02-18 LAB — PATHOLOGY/CYTOLOGY REPORT: NORMAL

## 2022-05-16 ENCOUNTER — TELEPHONE (OUTPATIENT)
Dept: INTERNAL MEDICINE CLINIC | Age: 57
End: 2022-05-16

## 2022-05-16 NOTE — TELEPHONE ENCOUNTER
Left voice mail for patient to call office to reschedule appointment that was cancelled on 3/21/22 for Return in about 6 months (around 3/20/2022) for LIPIDS

## 2022-05-17 NOTE — TELEPHONE ENCOUNTER
Left voice mail for patient to call office to reschedule appointment that was cancelled on 3/21/22 for Return in about 6 months (around 3/20/2022) for LIPIDS, 2nd call

## 2022-05-18 NOTE — TELEPHONE ENCOUNTER
Left voice mail for patient to call office to reschedule appointment that was cancelled on 3/21/22 for Return in about 6 months (around 3/20/2022) for LIPIDS, 3rd call

## 2022-08-03 ENCOUNTER — TELEPHONE (OUTPATIENT)
Dept: INTERNAL MEDICINE CLINIC | Age: 57
End: 2022-08-03

## 2022-08-03 DIAGNOSIS — Z00.00 ANNUAL PHYSICAL EXAM: Primary | ICD-10-CM

## 2022-08-03 NOTE — TELEPHONE ENCOUNTER
Patient scheduled a Physical with Dr. Ramón Murillo for 2022 and requested that new lab orders be put in for him as the old ones will  before his appointment.

## 2022-08-03 NOTE — TELEPHONE ENCOUNTER
Tried calling patient, got the \"were sorry we cant take your phone call right now please leave a message. The mail box is full.

## 2022-08-03 NOTE — TELEPHONE ENCOUNTER
Please call patient: Order for fasting laboratory studies, ordered in epic, obtain a few days before office visit  Dr. Merle Jones

## 2022-09-16 ENCOUNTER — HOSPITAL ENCOUNTER (OUTPATIENT)
Age: 57
Discharge: HOME | End: 2022-09-16
Payer: COMMERCIAL

## 2022-09-16 DIAGNOSIS — Z00.00 ANNUAL PHYSICAL EXAM: ICD-10-CM

## 2022-09-16 LAB
A/G RATIO: 2.1 (ref 1.1–2.2)
ALBUMIN SERPL-MCNC: 4.5 G/DL (ref 3.4–5)
ALP BLD-CCNC: 61 U/L (ref 40–129)
ALT SERPL-CCNC: 34 U/L (ref 10–40)
ANION GAP SERPL CALCULATED.3IONS-SCNC: 11 MMOL/L (ref 3–16)
AST SERPL-CCNC: 26 U/L (ref 15–37)
BILIRUB SERPL-MCNC: 0.9 MG/DL (ref 0–1)
BUN BLDV-MCNC: 13 MG/DL (ref 7–20)
CALCIUM SERPL-MCNC: 9.1 MG/DL (ref 8.3–10.6)
CHLORIDE BLD-SCNC: 103 MMOL/L (ref 99–110)
CHOLESTEROL, TOTAL: 217 MG/DL (ref 0–199)
CO2: 22 MMOL/L (ref 21–32)
CREAT SERPL-MCNC: 1.1 MG/DL (ref 0.9–1.3)
GFR AFRICAN AMERICAN: >60
GFR NON-AFRICAN AMERICAN: >60
GLUCOSE BLD-MCNC: 88 MG/DL (ref 70–99)
HCT VFR BLD CALC: 45 % (ref 40.5–52.5)
HDLC SERPL-MCNC: 58 MG/DL (ref 40–60)
HEMOGLOBIN: 15.7 G/DL (ref 13.5–17.5)
LDL CHOLESTEROL CALCULATED: 135 MG/DL
MCH RBC QN AUTO: 31.2 PG (ref 26–34)
MCHC RBC AUTO-ENTMCNC: 35 G/DL (ref 31–36)
MCV RBC AUTO: 89.3 FL (ref 80–100)
PDW BLD-RTO: 13.5 % (ref 12.4–15.4)
PLATELET # BLD: 145 K/UL (ref 135–450)
PMV BLD AUTO: 7.7 FL (ref 5–10.5)
POTASSIUM SERPL-SCNC: 4.2 MMOL/L (ref 3.5–5.1)
RBC # BLD: 5.04 M/UL (ref 4.2–5.9)
SODIUM BLD-SCNC: 136 MMOL/L (ref 136–145)
TOTAL PROTEIN: 6.6 G/DL (ref 6.4–8.2)
TRIGL SERPL-MCNC: 118 MG/DL (ref 0–150)
VLDLC SERPL CALC-MCNC: 24 MG/DL
WBC # BLD: 5.8 K/UL (ref 4–11)

## 2022-09-16 PROCEDURE — 36415 COLL VENOUS BLD VENIPUNCTURE: CPT

## 2022-09-16 PROCEDURE — 85027 COMPLETE CBC AUTOMATED: CPT

## 2022-09-16 PROCEDURE — 80053 COMPREHEN METABOLIC PANEL: CPT

## 2022-09-16 PROCEDURE — 84153 ASSAY OF PSA TOTAL: CPT

## 2022-09-16 PROCEDURE — 80061 LIPID PANEL: CPT

## 2022-09-17 LAB — PROSTATE SPECIFIC ANTIGEN: 0.73 NG/ML (ref 0–4)

## 2022-09-26 NOTE — PROGRESS NOTES
History and Physical      Chiqui Mack  YOB: 1965    Date of Service:  9/27/2022    Chief Complaint:   Chiqui Mack is a 59 y.o. male who presents for complete physical examination. HPI:       Patient here for history and physical exam.  Reviewed last office visit with me: 9/20/2021 history and physical at that time. Patient with insomnia, low back pain, tinnitus about both years, colon polyps, hyperlipidemia, hematuria, low vitamin D level. Underwent evaluation for hematuria 1/7/2022 [facility] Dr. Bernarda Jones Dx hematuria. Work-up: Cystoscopy 2/14/2022 negative. FISH test negative. CT 2/14/2022 negative for renal mass or calculus. No hydronephrosis  Medicines and allergies reviewed. Reviewed laboratory data: 9/16/2022: Chemistry panel normal.  Lipid panel: Total cholesterol: 270. LDL cholesterol 135. Author Alexandra CBC is normal.  PSA 0.73. Health maintenance: Shingles vaccine. Influenza vaccine. 12/9/2021: GXT: Normal exercise EKG. Hypertensive pressure response. Today:      Patient relates a couple months ago was picking up his dog complains of low back pain now much better. Discussed need for colonoscopy. Complains of left ear tinnitus. Denies significant noise exposure. Elevated lipids. Discussed \"The Mediterranean Diet\". Patient notes? Genetic situation with \"weakness in the legs\"? For which his mother, maternal uncle, maternal grandmother were affected. Does not remember the name of the problem.   He will discuss further after he obtains more information    Wt Readings from Last 3 Encounters:   09/27/22 208 lb 9.6 oz (94.6 kg)   09/20/21 208 lb (94.3 kg)   03/16/21 210 lb (95.3 kg)     BP Readings from Last 3 Encounters:   09/27/22 126/70   09/20/21 124/82   03/16/21 110/70       Patient Active Problem List   Diagnosis    Insomnia    Low back pain    Hyperlipidemia    Tinnitus of both ears    Hematuria    Low vitamin D level    Colon polyps Preventive Care:  Health Maintenance   Topic Date Due    Hepatitis C screen  Never done    Shingles vaccine (1 of 2) Never done    COVID-19 Vaccine (4 - Booster for Moderna series) 01/18/2023    DTaP/Tdap/Td vaccine (2 - Td or Tdap) 04/12/2023    Depression Screen  09/27/2023    Colorectal Cancer Screen  11/04/2026    Lipids  09/16/2027    Flu vaccine  Completed    Hepatitis A vaccine  Aged Out    Hepatitis B vaccine  Aged Out    Hib vaccine  Aged Out    Meningococcal (ACWY) vaccine  Aged Out    Pneumococcal 0-64 years Vaccine  Aged Out    HIV screen  Discontinued      Self-testicular exams: n  Sexual activity:  y  Last eye exam: 1 yr,   Exercise: n  Seatbelt use: y  Lipid panel:    Lab Results   Component Value Date    CHOL 217 (H) 09/16/2022    TRIG 118 09/16/2022    HDL 58 09/16/2022    [address] 135 (H) 09/16/2022       Living will: y    Immunization History   Administered Date(s) Administered    COVID-19, MODERNA BLUE border, Primary or Immunocompromised, (age 12y+), IM, 100 mcg/0.5mL 03/24/2021, 04/21/2021    COVID-19, MODERNA Bivalent BOOSTER, (age 18y+), IM, 50 mcg/0.5 mL 09/18/2022    Influenza Vaccine, unspecified formulation 10/01/2016    Influenza Virus Vaccine 09/22/2020    Tdap (Boostrix, Adacel) 04/12/2013       No Known Allergies  Outpatient Medications Marked as Taking for the 9/27/22 encounter (Office Visit) with Jaime Gudino MD   Medication Sig Dispense Refill    Cholecalciferol (VITAMIN D3 PO) Take by mouth         Past Medical History:   Diagnosis Date    Colon polyps 11/04/2016    sessile serrated adenomas: Dr. Luz Marina Contreras    Hyperlipidemia     Sinusitis     Tinnitus of both ears      Past Surgical History:   Procedure Laterality Date    COLONOSCOPY  11/04/2016    sessile serrated adenomas x 4: Diverticulosis.   Dr. Luz Marina Contreras     Family History   Problem Relation Age of Onset    Cancer Father         Throat CA    Other Father         Tob / ETOH use    Cancer Mother         Breast CA with Mets Arthritis Mother      Social History     Socioeconomic History    Marital status:      Spouse name: Not on file    Number of children: Not on file    Years of education: Not on file    Highest education level: Not on file   Occupational History    Not on file   Tobacco Use    Smoking status: Former     Packs/day: 1.00     Years: 12.00     Pack years: 12.00     Types: Cigarettes     Quit date: 1995     Years since quittin.7    Smokeless tobacco: Never   Substance and Sexual Activity    Alcohol use: Yes     Alcohol/week: 5.8 standard drinks     Types: 7 Standard drinks or equivalent per week     Comment: 1 a day    Drug use: No    Sexual activity: Never   Other Topics Concern    Not on file   Social History Narrative    Not on file     Social Determinants of Health     Financial Resource Strain: Low Risk     Difficulty of Paying Living Expenses: Not hard at all   Food Insecurity: No Food Insecurity    Worried About Running Out of Food in the Last Year: Never true    Ran Out of Food in the Last Year: Never true   Transportation Needs: Not on file   Physical Activity: Not on file   Stress: Not on file   Social Connections: Not on file   Intimate Partner Violence: Not on file   Housing Stability: Not on file       Review of Systems:  Constitutional: negative  HENT: negative  EYES: negative  Respiratory: negative  Cardiovasular borderline elevated LDL cholesterol. Discussed diet  Gastrointestinal: negative  Endocrine: negative  Musculoskeletal: Back pain. Now improved. Skin: negative  Allergic/Immunological: negative  Hematological: negative  Psychiatric/Behavorial:  Insomia  : Hematuria w/u Neg  Renal: negative  CNS: negative    Physical Exam:     .Blood pressure 126/70, pulse 82, temperature 97.5 °F (36.4 °C), temperature source Temporal, weight 208 lb 9.6 oz (94.6 kg), SpO2 99 %. Constitutional: He is oriented to erson, place, and time. He appears well-developed and well-nourished. No distress. HEENT:   Head: Normocephalic and atraumatic. Right Ear: Tympanic membrane, external ear and ear canal normal.   Left Ear: Tympanic membrane, external ear and ear canal normal.   Nose: Nose normal.   Mouth/Throat: Oropharynx is clear and moist and mucous membranes are normal. No oropharyngeal exudate or posterior oropharyngeal erythema. He has no cervical adenopathy. Eyes: Conjunctivae and extraocular motions are normal. Pupils are equal, round, and reactive to light. Neck: Full passive range of motion without pain. Neck supple. No JVD present. Carotid bruit is not present. No mass and no thyromegaly present. Cardiovascular: Normal rate, regular rhythm, normal heart sounds and intact distal pulses. Exam reveals no gallop and no friction rub. No murmur heard. Pulmonary/Chest: Effort normal and breath sounds normal. No respiratory distress. He has no wheezes, rhonchi or rales. Abdominal: Soft, non-tender. Bowel sounds and aorta are normal. There is no organomegaly, mass or bruit. Genitourinary: Not examined. .  Musculoskeletal: Normal range of motion, no synovitis. He exhibits no edema. Neurological: He is alert and oriented to person, place, and time. He has normal reflexes. No cranial nerve deficit. Coordination normal.   Skin: Skin is warm, dry and intact. No suspicious lesions are noted. Psychiatric: He has a normal mood and affect. His speech is normal and behavior is normal. Judgment, cognition and memory are normal.  Testing: Urinalysis trace RBCs.   Previous work-up by urology negative    Results for orders placed or performed in visit on 09/27/22   POCT Urinalysis no Micro   Result Value Ref Range    Color, UA YELLOW     Clarity, UA CLEAR     Glucose, UA POC NEG     Bilirubin, UA NEG     Ketones, UA NEG     Spec Grav, UA 1.025     Blood, UA POC TRACE     pH, UA 5.5     Protein, UA POC NEG     Urobilinogen, UA 0.2     Leukocytes, UA NEG     Nitrite, UA NEG      Assessment/Plan:  Patient Active Problem List   Diagnosis    Insomnia    Low back pain    Hyperlipidemia    Tinnitus of both ears    Hematuria    Low vitamin D level    Colon polyps     RADHAMES was seen today for annual exam.    Diagnoses and all orders for this visit:    Annual physical exam  -     POCT Urinalysis no Micro: Hematuria trace. Previous work-up by urology unremarkable. Hyperlipidemia, unspecified hyperlipidemia type           Total cholesterol 217. LDL cholesterol 135. Discussed The Mediterranean Diet: Retest fasting 9 to 12 months    Insomnia, unspecified type          Baseline at this time. Tinnitus of both ears             If becomes more notable referral to ENT. Denies loud noise exposure    Low vitamin D level             Repeated next office visit    Hematuria, unspecified type                Work-up per urology negative.     Patient's form from work completed    Dr. Theodor Aase

## 2022-09-27 ENCOUNTER — OFFICE VISIT (OUTPATIENT)
Dept: INTERNAL MEDICINE CLINIC | Age: 57
End: 2022-09-27
Payer: COMMERCIAL

## 2022-09-27 VITALS
BODY MASS INDEX: 28.89 KG/M2 | DIASTOLIC BLOOD PRESSURE: 70 MMHG | WEIGHT: 208.6 LBS | HEART RATE: 82 BPM | TEMPERATURE: 97.5 F | SYSTOLIC BLOOD PRESSURE: 126 MMHG | OXYGEN SATURATION: 99 %

## 2022-09-27 DIAGNOSIS — Z00.00 ANNUAL PHYSICAL EXAM: Primary | ICD-10-CM

## 2022-09-27 DIAGNOSIS — R79.89 LOW VITAMIN D LEVEL: ICD-10-CM

## 2022-09-27 DIAGNOSIS — E78.5 HYPERLIPIDEMIA, UNSPECIFIED HYPERLIPIDEMIA TYPE: ICD-10-CM

## 2022-09-27 DIAGNOSIS — R31.9 HEMATURIA, UNSPECIFIED TYPE: ICD-10-CM

## 2022-09-27 DIAGNOSIS — H93.13 TINNITUS OF BOTH EARS: ICD-10-CM

## 2022-09-27 DIAGNOSIS — G47.00 INSOMNIA, UNSPECIFIED TYPE: ICD-10-CM

## 2022-09-27 LAB
BILIRUBIN, POC: NORMAL
BLOOD URINE, POC: NORMAL
CLARITY, POC: CLEAR
COLOR, POC: YELLOW
GLUCOSE URINE, POC: NORMAL
KETONES, POC: NORMAL
LEUKOCYTE EST, POC: NORMAL
NITRITE, POC: NORMAL
PH, POC: 5.5
PROTEIN, POC: NORMAL
SPECIFIC GRAVITY, POC: 1.02
UROBILINOGEN, POC: 0.2

## 2022-09-27 PROCEDURE — 99396 PREV VISIT EST AGE 40-64: CPT | Performed by: INTERNAL MEDICINE

## 2022-09-27 PROCEDURE — 81002 URINALYSIS NONAUTO W/O SCOPE: CPT | Performed by: INTERNAL MEDICINE

## 2022-09-27 SDOH — ECONOMIC STABILITY: FOOD INSECURITY: WITHIN THE PAST 12 MONTHS, THE FOOD YOU BOUGHT JUST DIDN'T LAST AND YOU DIDN'T HAVE MONEY TO GET MORE.: NEVER TRUE

## 2022-09-27 SDOH — ECONOMIC STABILITY: FOOD INSECURITY: WITHIN THE PAST 12 MONTHS, YOU WORRIED THAT YOUR FOOD WOULD RUN OUT BEFORE YOU GOT MONEY TO BUY MORE.: NEVER TRUE

## 2022-09-27 ASSESSMENT — PATIENT HEALTH QUESTIONNAIRE - PHQ9
SUM OF ALL RESPONSES TO PHQ QUESTIONS 1-9: 0
1. LITTLE INTEREST OR PLEASURE IN DOING THINGS: 0
SUM OF ALL RESPONSES TO PHQ QUESTIONS 1-9: 0
SUM OF ALL RESPONSES TO PHQ9 QUESTIONS 1 & 2: 0
2. FEELING DOWN, DEPRESSED OR HOPELESS: 0

## 2022-09-27 ASSESSMENT — SOCIAL DETERMINANTS OF HEALTH (SDOH): HOW HARD IS IT FOR YOU TO PAY FOR THE VERY BASICS LIKE FOOD, HOUSING, MEDICAL CARE, AND HEATING?: NOT HARD AT ALL

## 2023-03-23 ENCOUNTER — TELEPHONE (OUTPATIENT)
Dept: INTERNAL MEDICINE CLINIC | Age: 58
End: 2023-03-23

## 2023-03-23 NOTE — TELEPHONE ENCOUNTER
LVM for the patient to call the office to let us know who he has chosen as PCP. And if we don't hear from him he will need to call to establish with another provider in the office if he wants to be seen here.

## 2023-04-05 NOTE — PROGRESS NOTES
lower extremity edema. Continues to wake up at night multiple times has tried melatonin, trazodone without any relief. Occasionally snores but does not think it is a problem. Notes of stable mood at this time. Does not want to be on any medications for sleep.    -Reviewed last visit summary with Dr. Trell Cordero. Patient with insomnia, low back pain, tinnitus, hyperlipidemia, tinnitus of both ears, low vitamin D, hematuria followed up with urology presenting to establish care with me. ROS    CONSTITUTIONAL:  No fevers, chills, sweats or weight changes  EYES:  No redness or visual symptoms. EARS, NOSE AND THROAT:  No difficulties with hearing. No symptoms of rhinitis or sore throat. CARDIOVASCULAR:  No chest pains, palpitations, orthopnea or paroxysmal noctunal dyspnea. RESPIRATORY:  No dyspnea o exertion, wheezing or cough. GI:  No nausea, vomiting, diarrhea, constipation, abdominal pain, hematochezia or melena. :  No dysuria, urinary hesitancy or dribbling. No nocturia or urinary frequency. No abnormal urethral  discharge. MUSCULOSKELETAL:  No muscle, joint or back aches  NEUROLOGIC:  No chronic headaches, no seizures. No numbness, tingling or weakness. PSYCHIATRIC:  No anxiety, mood or sleep disturbance. ENDOCRINE:  No excessive urination or excessive thirst.  DERMATOLOGIC:  No rashes or skin changes. HISTORIES  Current Outpatient Medications on File Prior to Visit   Medication Sig Dispense Refill    Cholecalciferol (VITAMIN D3 PO) Take by mouth       No current facility-administered medications on file prior to visit.       Past Medical History:   Diagnosis Date    Colon polyps 11/04/2016    sessile serrated adenomas: Dr. Trini Campa    Hyperlipidemia     Sinusitis     Tinnitus of both ears      Patient Active Problem List   Diagnosis    Insomnia    Low back pain    Hyperlipidemia    Tinnitus of both ears    Hematuria    Low vitamin D level    Colon polyps       PHYSICAL EXAM    Vitals:    04/06/23

## 2023-04-06 ENCOUNTER — OFFICE VISIT (OUTPATIENT)
Dept: INTERNAL MEDICINE CLINIC | Age: 58
End: 2023-04-06
Payer: COMMERCIAL

## 2023-04-06 VITALS
TEMPERATURE: 97.3 F | BODY MASS INDEX: 29.54 KG/M2 | WEIGHT: 211 LBS | SYSTOLIC BLOOD PRESSURE: 122 MMHG | HEIGHT: 71 IN | HEART RATE: 81 BPM | DIASTOLIC BLOOD PRESSURE: 72 MMHG | OXYGEN SATURATION: 97 %

## 2023-04-06 DIAGNOSIS — E66.3 OVERWEIGHT (BMI 25.0-29.9): ICD-10-CM

## 2023-04-06 DIAGNOSIS — G47.00 INSOMNIA, UNSPECIFIED TYPE: ICD-10-CM

## 2023-04-06 DIAGNOSIS — E78.5 HYPERLIPIDEMIA, UNSPECIFIED HYPERLIPIDEMIA TYPE: Primary | ICD-10-CM

## 2023-04-06 PROCEDURE — 99213 OFFICE O/P EST LOW 20 MIN: CPT | Performed by: STUDENT IN AN ORGANIZED HEALTH CARE EDUCATION/TRAINING PROGRAM

## 2023-04-06 SDOH — ECONOMIC STABILITY: FOOD INSECURITY: WITHIN THE PAST 12 MONTHS, THE FOOD YOU BOUGHT JUST DIDN'T LAST AND YOU DIDN'T HAVE MONEY TO GET MORE.: NEVER TRUE

## 2023-04-06 SDOH — ECONOMIC STABILITY: HOUSING INSECURITY
IN THE LAST 12 MONTHS, WAS THERE A TIME WHEN YOU DID NOT HAVE A STEADY PLACE TO SLEEP OR SLEPT IN A SHELTER (INCLUDING NOW)?: NO

## 2023-04-06 SDOH — ECONOMIC STABILITY: FOOD INSECURITY: WITHIN THE PAST 12 MONTHS, YOU WORRIED THAT YOUR FOOD WOULD RUN OUT BEFORE YOU GOT MONEY TO BUY MORE.: NEVER TRUE

## 2023-04-06 SDOH — ECONOMIC STABILITY: INCOME INSECURITY: HOW HARD IS IT FOR YOU TO PAY FOR THE VERY BASICS LIKE FOOD, HOUSING, MEDICAL CARE, AND HEATING?: NOT HARD AT ALL

## 2023-04-06 ASSESSMENT — PATIENT HEALTH QUESTIONNAIRE - PHQ9
1. LITTLE INTEREST OR PLEASURE IN DOING THINGS: 0
SUM OF ALL RESPONSES TO PHQ QUESTIONS 1-9: 0
2. FEELING DOWN, DEPRESSED OR HOPELESS: 0
SUM OF ALL RESPONSES TO PHQ9 QUESTIONS 1 & 2: 0
SUM OF ALL RESPONSES TO PHQ QUESTIONS 1-9: 0

## 2024-04-22 ENCOUNTER — OFFICE VISIT (OUTPATIENT)
Dept: INTERNAL MEDICINE CLINIC | Age: 59
End: 2024-04-22
Payer: COMMERCIAL

## 2024-04-22 VITALS
OXYGEN SATURATION: 98 % | SYSTOLIC BLOOD PRESSURE: 124 MMHG | HEIGHT: 71 IN | DIASTOLIC BLOOD PRESSURE: 68 MMHG | WEIGHT: 211.8 LBS | BODY MASS INDEX: 29.65 KG/M2 | TEMPERATURE: 98 F | HEART RATE: 87 BPM

## 2024-04-22 DIAGNOSIS — J32.0 CHRONIC MAXILLARY SINUSITIS: Primary | ICD-10-CM

## 2024-04-22 PROCEDURE — 99213 OFFICE O/P EST LOW 20 MIN: CPT | Performed by: NURSE PRACTITIONER

## 2024-04-22 RX ORDER — AMOXICILLIN AND CLAVULANATE POTASSIUM 875; 125 MG/1; MG/1
1 TABLET, FILM COATED ORAL 2 TIMES DAILY
Qty: 28 TABLET | Refills: 0 | Status: SHIPPED | OUTPATIENT
Start: 2024-04-22 | End: 2024-05-06

## 2024-04-22 RX ORDER — PREDNISONE 20 MG/1
TABLET ORAL
Qty: 19 TABLET | Refills: 0 | Status: SHIPPED | OUTPATIENT
Start: 2024-04-22

## 2024-04-22 SDOH — ECONOMIC STABILITY: INCOME INSECURITY: HOW HARD IS IT FOR YOU TO PAY FOR THE VERY BASICS LIKE FOOD, HOUSING, MEDICAL CARE, AND HEATING?: NOT VERY HARD

## 2024-04-22 SDOH — ECONOMIC STABILITY: FOOD INSECURITY: WITHIN THE PAST 12 MONTHS, THE FOOD YOU BOUGHT JUST DIDN'T LAST AND YOU DIDN'T HAVE MONEY TO GET MORE.: NEVER TRUE

## 2024-04-22 SDOH — ECONOMIC STABILITY: FOOD INSECURITY: WITHIN THE PAST 12 MONTHS, YOU WORRIED THAT YOUR FOOD WOULD RUN OUT BEFORE YOU GOT MONEY TO BUY MORE.: NEVER TRUE

## 2024-04-22 ASSESSMENT — ENCOUNTER SYMPTOMS
VOMITING: 0
COUGH: 0
SHORTNESS OF BREATH: 0
DIARRHEA: 0
FACIAL SWELLING: 0
NAUSEA: 0
SINUS PAIN: 1
SINUS PRESSURE: 1
SORE THROAT: 0
WHEEZING: 0

## 2024-04-22 ASSESSMENT — PATIENT HEALTH QUESTIONNAIRE - PHQ9
SUM OF ALL RESPONSES TO PHQ QUESTIONS 1-9: 0
1. LITTLE INTEREST OR PLEASURE IN DOING THINGS: NOT AT ALL
SUM OF ALL RESPONSES TO PHQ QUESTIONS 1-9: 0

## 2025-06-16 NOTE — PROGRESS NOTES
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 Chronic maxillary sinusitis  Start augmentin and Prednisone.   Advised to use Flonase OTC.   Anti histamine consider additionally.   Increase fluids and rest.     - amoxicillin-clavulanate (AUGMENTIN) 875-125 MG per tablet; Take 1 tablet by mouth 2 times daily for 14 days  Dispense: 28 tablet; Refill: 0  - predniSONE (DELTASONE) 20 MG tablet; 3 po daily x 3 days, 2 po daily x 3 days, 1 po daily x 3 days, 1/2 po daily x 2 days  Dispense: 19 tablet; Refill: 0      Return if symptoms worsen or fail to improve.      HPI:      Pt states he dx COVID 2 weeks before Xmas. Had some difficulty breathing x 1 day but then back at work in 5 days. Over the next several months he would have headache in the morning, sour taste in mouth after eating or drinking, sinus congestion intermittently throughout the day.   It would wake him 1:30AM-3AM with severe inflammation in sinus cavities.     About 1 month ago, [place] vacation - no symptoms. Symptoms all returned when he came back.   Claritin D started OTC x 3 doses - no relief. Last week, yellowish green symptoms/mucus. No ear pain. No sore throat. Felt feverish intermittently with headache          /68   Pulse 87   Temp 98 °F (36.7 °C)   Ht 1.803 m (5' 11\")   Wt 96.1 kg (211 lb 12.8 oz)   SpO2 98%   BMI 29.54 kg/m²     Prior to Visit Medications    Medication Sig Taking? Authorizing Provider   amoxicillin-clavulanate (AUGMENTIN) 875-125 MG per tablet Take 1 tablet by mouth 2 times daily for 14 days Yes Juan Ramon Madrid APRN - CNP   predniSONE (DELTASONE) 20 MG tablet 3 po daily x 3 days, 2 po daily x 3 days, 1 po daily x 3 days, 1/2 po daily x 2 days Yes Juan Ramon Madrid APRN - CNP   Cholecalciferol (VITAMIN D3 PO) Take by mouth Yes Provider, Historical, MD     Family History   Problem Relation Age of Onset    Cancer Father          Thank you for [facility]) today for your pre-procedure evaluation, you were seen by Donald Ragland ([phone])    This summary includes instructions and information to aid you during your perioperative period.  Please read carefully. If you have any questions about your visit today, please call the number listed above.  If you become ill or have any changes to your health before your surgery, please contact your primary care provider and alert your surgeon.    Preparing for your Surgery       Exercises  Preoperative Deep Breathing Exercises  Why it is important to do deep breathing exercises before my surgery?  Deep breathing exercises strengthen your breathing muscles.  This helps you to recover after your surgery and decreases the chance of breathing complications.  How are the deep breathing exercises done?  Sit straight with your back supported.  Breathe in deeply and slowly through your nose. Your lower rib cage should expand and your abdomen may move forward.  Hold that breath for 3 to 5 seconds.  Breathe out through pursed lips, slowly and completely.  Rest and repeat 10 times every hour while awake.  Rest longer if you become dizzy or lightheaded.       Incentive Spirometer   You were provided with an incentive spirometer in [facility], please follow the below instructions.   You were not provided an incentive spirometer in [facility], please disregard the incentive spirometer instructions  What is an incentive spirometer?  An incentive spirometer is a device used before and after surgery to “exercise” your lungs.  It helps you to take deeper breaths to expand your lungs.  Below is an example of a basic incentive spirometer.  The device you receive may differ slightly but they all function the same.    Why do I need to use an incentive spirometer?  Using your incentive spirometer prepares your lungs for surgery and helps prevent lung problems after surgery.  How do I use  my incentive spirometer?  When you're using your incentive spirometer, make sure to breathe through your mouth. If you breathe through your nose, the incentive spirometer won't work properly. You can hold your nose if you have trouble.  If you feel dizzy at any time, stop and rest. Try again at a later time.  Follow the steps below:  Set up your incentive spirometer, expand the flexible tubing and connect to the outlet.  Sit upright in a chair or bed. Hold the incentive spirometer at eye level.   Put the mouthpiece in your mouth and close your lips tightly around it. Slowly breathe out (exhale) completely.  Breathe in (inhale) slowly through your mouth as deeply as you can. As you take a breath, you will see the piston rise inside the large column. While the piston rises, the indicator should move upwards. It should stay in between the 2 arrows (see Figure).  Try to get the piston as high as you can, while keeping the indicator between the arrows.   If the indicator doesn't stay between the arrows, you're breathing either too fast or too slow.  When you get it as high as you can, hold your breath for 10 seconds, or as long as possible. While you're holding your breath, the piston will slowly fall to the base of the spirometer.  Once the piston reaches the bottom of the spirometer, breathe out slowly through your mouth. Rest for a few seconds.  Repeat 10 times. Try to get the piston to the same level with each breath.  Repeat every hour while awake  You can carefully clean the outside of the mouthpiece with an alcohol wipe or soap and water.      Preoperative Brain Exercises    What are brain exercises?  A brain exercise is any activity that engages your thinking (cognitive) skills.    What types of activities are considered brain exercises?  Jigsaw puzzles, crossword puzzles, word jumble, memory games, word search, and many more.  Many can be found free online or on your phone via a mobile padmaja.    Why should I do  brain exercises before my surgery?  More recent research has shown brain exercise before surgery can lower the risk of postoperative delirium (confusion) which can be especially important for older adults.  Patients who did brain exercises for 5 to 10 hours the days before surgery, cut their risk of postoperative delirium in half up to 1 week after surgery.    Sit-to-Stand Exercise    What is the sit-to-stand exercise?  The sit-to-stand exercise strengthens the muscles of your lower body and muscles in the center of your body (core muscles for stability) helping to maintain and improve your strength and mobility.  How do I do the sit-to-stand exercise?  The goal is to do this exercise without using your arms or hands.  If this is too difficult, use your arms and hands or a chair with armrests to help slowly push yourself to the standing position and lower yourself back to the sitting position. As the movement becomes easier use your arms and hands less.    Steps to the sit-to-stand exercise  Sit up tall in a sturdy chair, knees bent, feet flat on the floor shoulder-width apart.  Shift your hips/pelvis forward in the chair to correctly position yourself for the next movement.  Lean forward at your hips.  Stand up straight putting equal weight on both feet.  Check to be sure you are properly aligned with the chair, in a slow controlled movement sit back down.  Repeat this exercise 10-15 times.  If needed you can do it fewer times until your strength improves.  Rest for 1 minute.  Do another 10-15 sit-to-stand exercises.  Try to do this in the morning and evening.        Instructions    Preoperative Fasting Guidelines    Why must I stop eating and drinking near surgery time?  With sedation, food or liquid in your stomach can enter your lungs causing serious complications  Food can increase nausea and vomiting  When do I need to stop eating and drinking before my surgery?      Do not eat any food or drink any liquids  after midnight the night before your surgery/procedure.  You may have sips of water to take medications.            Simple things you can do to help prevent blood clots     Blood clots are blockages that can form in the body's veins. When a blood clot forms in your deep veins, it may be called a deep vein thrombosis, or DVT for short. Blood clots can happen in any part of the body where blood flows, but they are most common in the arms and legs. If a piece of a blood clot breaks free and travels to the lungs, it is called a pulmonary embolus (PE). A PE can be a very serious problem.         Being in the hospital or having surgery can raise your chances of getting a blood clot because you may not be well enough to move around as much as you normally do.         Ways you can help prevent blood clots in the hospital       Wearing SCDs  SCDs stands for Sequential Compression Devices.   SCDs are special sleeves that wrap around your legs. They attach to a pump that fills them with air to gently squeeze your legs every few minutes.  This helps return the blood in your legs to your heart.   SCDs should only be taken off when walking or bathing. SCDs may not be comfortable, but they can help save your life.              Pump SCD leg sleeves  Wearing compression stockings - if your doctor orders them. These special snug-fitting stockings gently squeeze your legs to help blood flow.       Walking. Walking helps move the blood in your legs.   If your doctor says it is ok, try walking the halls at least   5 times a day. Ask us to help you get up, so you don't fall.      Taking any blood-thinning medicines your doctor orders.              Ways you can help prevent blood clots at home         Wearing compression stockings - if your doctor orders them.   Walking - to help move the blood in your legs.    Taking any blood-thinning medicines your doctor orders.      Signs of a blood clot or PE    Tell your doctor or nurse right away  if you have any of the problems listed below.         If you are at home, seek medical care right away. Call 911 for chest pain or problems breathing.            Signs of a blood clot (DVT) - such as pain, swelling, redness, or warmth in your arm or legs.  Signs of a pulmonary embolism (PE) - such as chest pain or feeling short of breath      Tobacco and Alcohol;  Do not drink alcohol or smoke within 24 hours of surgery.  It is best to quit smoking for as long as possible before any surgery or procedure.    Quitting Smoking; Recognizing Dangerous Situations:  1-Alcohol use during the first month after quitting, 2-Being around smoke or someone who smokes 3-Times situation routinely smoked  4-Triggers-car, breaks, coffee, when awakening, social events  Coping Skills: 1-Learning new ways to manage stress 2-Exercising 3-Relaxation breathing   4-Change routines 5-Distraction techniques    Websites:  Smoke-Free - offers free text messages and an padmaja to help you quit. Info includes eating and mood issues that may come with quitting. There is a Live Helpline to talk to an expert. Go to smokefree.gov; Become an Ex-Smoker - the free EX Plan is based on scientific research and useful advice from ex-smokers. It isn't just about quitting smoking.  It's about re-learning life without cigarettes using a 3-step program.  Go to becomeanex.org   Centers for Disease Control offer many suggestions for helping you quit. Includes a Quit Guide and real-life stories. There are sections for specific groups such as LGBT, , different ethnic groups, and pregnant women.  Go to cdc.gov/tobacco/campaign/tips    Other Resources:  [place] Tobacco Quit Line - call 1-800-QUIT-NOW or 1-766.107.3070.  [facility] 2-1-1 - to find local programs and resources. Call 211 or go to 211.org.  [facility] Tobacco Cessation Program - call 441-125-0988.  American Lung Association offers classes for quitting smoking. Some places may charge a fee. For  a list of classes, go to lung.org or call 2-290-LUNGeCourier.co.ukNew Sunrise Regional Treatment Center.     Some things to think about:; The health benefits of quitting smoking can help most of the major parts of your body. There is no safe amount of cigarette smoke. Quitting smoking can add years to your life. When you quit, you'll also protect your loved ones from dangerous secondhand smoke. Make a plan, join a support group, and talk to your physician to assist in quitting smoking.                                                                                                              , Quitting Alcohol; Things to think about: Alcohol use disorder is a health condition that can improve with treatment. Each person is unique. A treatment that is good for one person may not be a good fit for someone else. Simply knowing the different options can be an important first step. Treatment can be inpatient, where you stay at a facility, or outpatient, where you stay at home. Your insurance plan may cover some treatment costs.   Resources:    NIAAA Alcohol Treatment Navigator - from the National [place] on Alcohol Abuse and  Alcoholism. Offers an online tool to help you find the right treatment for you - near you. Go to alcoholtreatment.niaaa.nih.gov.  [facility] National Hotline  - from the Substance Abuse and Mental Health Services Administration. A free, confidential 24-hour treatment referral and information service for anyone facing mental and/or substance use disorders. Go to findtreatment.gov or call 2-945-532eCourier.co.ukHELP (5495).    Alcoholics Anonymous (AA) - offers group meetings and a 12-step program to anyone who has a drinking problem. Go to aa.org or call 691-180-4145    [facility] 2-1-1 - to find local programs and resources. Call 211 or go to 211.org    Other people you can talk to about treatment options include your primary care doctor, health insurance plan, local health department, or employee assistance program. You can also ask to talk to a hospital  .             The Week before Surgery        Seven days before Surgery  Check your [facility] medication instructions  Do the exercises provided to you by [facility]   Arrange for a responsible, adult licensed  to take you home after surgery and stay with you for 24 hours.  You will not be permitted to drive yourself home if you have received any anesthetic/sedation  Six days before surgery  Check your [facility] medication instructions  Do the exercises provided to you by [facility]   Start using Chlorhexidene (CHG) body wash if prescribed  Five days before surgery  Check your [facility] medication instructions  Do the exercises provided to you by [facility]   Continue to use CHG body wash if prescribed  Three days before surgery  Check your [facility] medication instructions  Do the exercises provided to you by [facility]   Continue to use CHG body wash if prescribed  Two days before surgery  Check your [facility] medication instructions  Do the exercises provided to you by [facility]   Continue to use CHG body wash if prescribed    The Day before Surgery       Check your [facility] medication and all other [facility] instructions including when to stop eating and drinking  You will be called with your arrival time for surgery in the late afternoon.  If you do not receive a call please reach out to your surgeon's office.  Do not smoke or drink 24 hours before surgery  Prepare items to bring with you to the hospital  Shower with your chlorhexidine wash if prescribed  Brush your teeth and use your chlorhexidine dental rinse if prescribed    The Day of Surgery       Check your [facility] medication instructions  Ensure you follow the instructions for when to stop eating and drinking  Shower, if prescribed use CHG.  Do not apply any lotions, creams, moisturizers, perfume or deodorant  Brush your teeth and use your CHG dental rinse if prescribed  Wear loose comfortable clothing  Avoid make-up  Remove  jewelry and piercings, consider professional piercing removal with a plastic spacer if  needed  Bring photo ID and Insurance card  Bring an accurate medication list that includes medication dose, frequency and allergies  Bring a copy of your advanced directives (will, health care power of )  Bring any devices and controllers as well as medical devices you have been provided with for surgery (CPAP, slings, braces, etc.)  Dentures, eyeglasses, and contacts will be removed before surgery, please bring cases for contacts or glasses